# Patient Record
Sex: FEMALE | Race: WHITE | NOT HISPANIC OR LATINO | Employment: PART TIME | URBAN - METROPOLITAN AREA
[De-identification: names, ages, dates, MRNs, and addresses within clinical notes are randomized per-mention and may not be internally consistent; named-entity substitution may affect disease eponyms.]

---

## 2024-06-17 ENCOUNTER — APPOINTMENT (OUTPATIENT)
Dept: RADIOLOGY | Facility: HOSPITAL | Age: 69
End: 2024-06-17
Payer: MEDICARE

## 2024-06-17 ENCOUNTER — HOSPITAL ENCOUNTER (EMERGENCY)
Facility: HOSPITAL | Age: 69
Discharge: HOME | End: 2024-06-17
Payer: MEDICARE

## 2024-06-17 VITALS
SYSTOLIC BLOOD PRESSURE: 124 MMHG | HEIGHT: 60 IN | RESPIRATION RATE: 20 BRPM | DIASTOLIC BLOOD PRESSURE: 74 MMHG | OXYGEN SATURATION: 98 % | WEIGHT: 110 LBS | TEMPERATURE: 99 F | BODY MASS INDEX: 21.6 KG/M2 | HEART RATE: 77 BPM

## 2024-06-17 DIAGNOSIS — S20.212A CONTUSION OF RIB ON LEFT SIDE, INITIAL ENCOUNTER: Primary | ICD-10-CM

## 2024-06-17 PROCEDURE — 71101 X-RAY EXAM UNILAT RIBS/CHEST: CPT | Mod: LEFT SIDE | Performed by: RADIOLOGY

## 2024-06-17 PROCEDURE — 99283 EMERGENCY DEPT VISIT LOW MDM: CPT

## 2024-06-17 PROCEDURE — 71101 X-RAY EXAM UNILAT RIBS/CHEST: CPT | Mod: LT

## 2024-06-17 ASSESSMENT — PAIN SCALES - GENERAL: PAINLEVEL_OUTOF10: 3

## 2024-06-17 ASSESSMENT — LIFESTYLE VARIABLES
HAVE PEOPLE ANNOYED YOU BY CRITICIZING YOUR DRINKING: NO
EVER FELT BAD OR GUILTY ABOUT YOUR DRINKING: NO
HAVE YOU EVER FELT YOU SHOULD CUT DOWN ON YOUR DRINKING: NO
TOTAL SCORE: 0
EVER HAD A DRINK FIRST THING IN THE MORNING TO STEADY YOUR NERVES TO GET RID OF A HANGOVER: NO

## 2024-06-17 ASSESSMENT — COLUMBIA-SUICIDE SEVERITY RATING SCALE - C-SSRS
6. HAVE YOU EVER DONE ANYTHING, STARTED TO DO ANYTHING, OR PREPARED TO DO ANYTHING TO END YOUR LIFE?: NO
1. IN THE PAST MONTH, HAVE YOU WISHED YOU WERE DEAD OR WISHED YOU COULD GO TO SLEEP AND NOT WAKE UP?: NO
2. HAVE YOU ACTUALLY HAD ANY THOUGHTS OF KILLING YOURSELF?: NO

## 2024-06-17 ASSESSMENT — PAIN DESCRIPTION - PAIN TYPE: TYPE: ACUTE PAIN

## 2024-06-17 ASSESSMENT — PAIN - FUNCTIONAL ASSESSMENT: PAIN_FUNCTIONAL_ASSESSMENT: 0-10

## 2024-06-17 NOTE — ED PROVIDER NOTES
HPI   Chief Complaint   Patient presents with    Rib Injury       68-year-old female presents to the emergency department for complaints of left rib pain.  Patient states that she tripped and fell 3 days ago, landing on her left arm which was bent under her ribs.  She states her arm and leg are fine but she is having pain in her ribs.  She states that is very mild, only a 2 out of 10 but she just thought she should make sure was not broken or cracked.  She states that she did not strike her head or lose consciousness.  She denies difficulty breathing, chest pain, abdominal pain, vomiting, bloody stools, bloody urine.  She does not smoke.  She does not take anticoagulants.                          No data recorded                   Patient History   History reviewed. No pertinent past medical history.  History reviewed. No pertinent surgical history.  No family history on file.  Social History     Tobacco Use    Smoking status: Never    Smokeless tobacco: Never   Substance Use Topics    Alcohol use: Not on file    Drug use: Not on file       Physical Exam   ED Triage Vitals [06/17/24 1624]   Temperature Heart Rate Respirations BP   37.2 °C (99 °F) 80 17 128/77      Pulse Ox Temp src Heart Rate Source Patient Position   98 % -- -- --      BP Location FiO2 (%)     -- --       Physical Exam  Vitals and nursing note reviewed.   Constitutional:       General: She is not in acute distress.  HENT:      Head: Atraumatic.      Mouth/Throat:      Mouth: Mucous membranes are moist.      Pharynx: Oropharynx is clear.   Eyes:      Extraocular Movements: Extraocular movements intact.      Conjunctiva/sclera: Conjunctivae normal.      Pupils: Pupils are equal, round, and reactive to light.   Cardiovascular:      Rate and Rhythm: Normal rate and regular rhythm.      Pulses: Normal pulses.   Pulmonary:      Effort: Pulmonary effort is normal. No respiratory distress.      Breath sounds: Normal breath sounds.   Abdominal:       General: There is no distension.      Palpations: Abdomen is soft.      Tenderness: There is no abdominal tenderness. There is no guarding or rebound.   Musculoskeletal:         General: No deformity.      Cervical back: Neck supple.      Comments: Questionable swelling/deformity of the left anterior lower ribs, although no crepitus, ecchymosis, erythema.   Skin:     General: Skin is warm and dry.   Neurological:      Mental Status: She is alert and oriented to person, place, and time. Mental status is at baseline.      Cranial Nerves: No cranial nerve deficit.      Sensory: No sensory deficit.      Motor: No weakness.   Psychiatric:         Mood and Affect: Mood normal.         Behavior: Behavior normal.         ED Course & MDM   Diagnoses as of 06/17/24 1903   Contusion of rib on left side, initial encounter       Medical Decision Making  68-year-old female presents emergency department for complaints of left rib pain after a fall 3 days ago.  On my exam, she has questionable deformity/swelling of her left anterior ribs but no crepitus or skin changes.  Her lungs are clear bilaterally.  Abdomen is soft and nontender.  Patient declines any pain medication.  X-ray of the left ribs and AP chest shows no acute process and no fracture.  Discussed the findings with the patient.  Recommended ice and anti-inflammatories.  Recommended close follow-up with primary care.  Discussed results with patient and/or family/friend and recommended close follow up with primary care or specialist.  Reviewed return precautions at length.  I answered all questions.           Procedure  Procedures     Maria A Brown PA-C  06/17/24 1903

## 2025-06-22 ENCOUNTER — APPOINTMENT (OUTPATIENT)
Dept: RADIOLOGY | Facility: HOSPITAL | Age: 70
End: 2025-06-22
Payer: MEDICARE

## 2025-06-22 ENCOUNTER — HOSPITAL ENCOUNTER (EMERGENCY)
Facility: HOSPITAL | Age: 70
Discharge: HOME | End: 2025-06-22
Payer: MEDICARE

## 2025-06-22 VITALS
TEMPERATURE: 98.2 F | HEART RATE: 76 BPM | WEIGHT: 100 LBS | OXYGEN SATURATION: 96 % | HEIGHT: 60 IN | DIASTOLIC BLOOD PRESSURE: 73 MMHG | BODY MASS INDEX: 19.63 KG/M2 | SYSTOLIC BLOOD PRESSURE: 130 MMHG | RESPIRATION RATE: 16 BRPM

## 2025-06-22 DIAGNOSIS — M25.472 LEFT ANKLE SWELLING: ICD-10-CM

## 2025-06-22 DIAGNOSIS — S93.402A SPRAIN OF LEFT ANKLE, INITIAL ENCOUNTER: Primary | ICD-10-CM

## 2025-06-22 PROCEDURE — 73630 X-RAY EXAM OF FOOT: CPT | Mod: LEFT SIDE | Performed by: RADIOLOGY

## 2025-06-22 PROCEDURE — 99284 EMERGENCY DEPT VISIT MOD MDM: CPT

## 2025-06-22 PROCEDURE — 73610 X-RAY EXAM OF ANKLE: CPT | Mod: LEFT SIDE | Performed by: RADIOLOGY

## 2025-06-22 PROCEDURE — 73630 X-RAY EXAM OF FOOT: CPT | Mod: LT

## 2025-06-22 PROCEDURE — 73610 X-RAY EXAM OF ANKLE: CPT | Mod: LT

## 2025-06-22 ASSESSMENT — PAIN SCALES - GENERAL: PAINLEVEL_OUTOF10: 7

## 2025-06-22 ASSESSMENT — LIFESTYLE VARIABLES
TOTAL SCORE: 0
EVER FELT BAD OR GUILTY ABOUT YOUR DRINKING: NO
HAVE YOU EVER FELT YOU SHOULD CUT DOWN ON YOUR DRINKING: NO
EVER HAD A DRINK FIRST THING IN THE MORNING TO STEADY YOUR NERVES TO GET RID OF A HANGOVER: NO
HAVE PEOPLE ANNOYED YOU BY CRITICIZING YOUR DRINKING: NO

## 2025-06-22 ASSESSMENT — PAIN - FUNCTIONAL ASSESSMENT: PAIN_FUNCTIONAL_ASSESSMENT: 0-10

## 2025-06-26 NOTE — ED PROVIDER NOTES
"HPI   Chief Complaint   Patient presents with    Ankle Pain     \"I missed a step and my ankle rolled.\"  Left ankle       69-year-old female presents to the emergency department for evaluation of left ankle pain that began just prior to arrival.  Patient states she was going down her basement steps when she caught her left foot on the last step and ended up rolling her ankle inward when she stepped down.  Does report that she took Advil at approximately 1100 today with some but minimal relief in her symptoms.  States that she is able to ambulate.  She denies any bruising or swelling.  Denies paresthesias, decreased range of motion, and warmth.  States that she did not fall to the ground, no head injury, loss of consciousness, lightheadedness or dizziness.  Denies chest pain, shortness of breath, fever, chills, body aches.  Denies any other concerns or symptoms at this time.      History provided by:  Patient   used: No      Patient History   Medical History[1]  Surgical History[2]  Family History[3]  Social History[4]    Physical Exam   ED Triage Vitals [06/22/25 1725]   Temperature Heart Rate Respirations BP   36.8 °C (98.2 °F) 76 16 130/73      Pulse Ox Temp Source Heart Rate Source Patient Position   96 % Temporal Monitor Sitting      BP Location FiO2 (%)     Right arm --       Physical Exam  Nursing notes reviewed and confirmed by me.  Chart review performed including medications, allergies, and medical, surgical, and family history    Constitutional: Vital signs per nursing notes.  Well developed, well nourished.  No acute distress.    Psychiatric: no abnormalities of mood or affect   Eyes: PERRL; conjunctivae and lids normal; EOMI  HENT: Head is normocephalic, atraumatic. External ears normal in appearance without drainage.  Nose is without deformity or drainage.  Posterior oropharynx without edema or erythema.  Moist mucous membranes.  Neck: neck supple, no meningismus signs or rigidity.  " trachea midline without deviation.   Respiratory: Breath sounds clear bilaterally no wheezes rales or rhonchi.  No respiratory distress.  Normal respiratory rate/effort.    Cardiovascular: regular rate and rhythm; no murmurs.   distal pulses intact throughout.  Neurological: normal speech patient is alert and oriented x3.  Patient able to move extremities.  Grossly intact strength and sensation of upper and lower extremities.  No focal neurologic deficits appreciated on exam.  GI: Abdomen is soft nontender.  No rebound, rigidity, or guarding.  No masses or hernias appreciated.  No organomegaly.  Lymphatic: no significant lymphadenopathy appreciated  Musculoskeletal: Patient able to move all extremities.  No deformities or swelling appreciated.  No calf tenderness or edema.  Full passive range of motion of left ankle, does have small amount of pain with eversion.  Tenderness to palpation of the left lateral malleolus.  No ecchymosis, small amount of swelling surrounding left ankle.    Skin:  no rash or erythema.  No wounds. Normal capillary refill.      ED Course & MDM   Diagnoses as of 06/25/25 2044   Sprain of left ankle, initial encounter   Left ankle swelling     Labs Reviewed - No data to display     XR ankle left 3+ views   Final Result   1. Mild diffuse soft tissue swelling at the left ankle. No   radiographic evidence for acute fracture at the left ankle or foot.                  MACRO:   None.        Signed by: Lizet Stone 6/22/2025 7:02 PM   Dictation workstation:   [id number]      XR foot left 3+ views   Final Result   1. Mild diffuse soft tissue swelling at the left ankle. No   radiographic evidence for acute fracture at the left ankle or foot.                  MACRO:   None.        Signed by: Lizet Stone 6/22/2025 7:02 PM   Dictation workstation:   [id number]            Medical Decision Making  Differential diagnoses considered but not limited to: Ankle sprain, fracture, dislocation, internal  derangement.    69-year-old female presents to the emergency department for evaluation of left ankle pain that began just prior to arrival after rolling her ankle.  On presentation /73, afebrile, HR 76, RR 16, SpO2 96%.  On exam patient is nontoxic-appearing, no acute distress.  Heart sounds normal with regular rate and rhythm.  Lungs clear to auscultation bilaterally with no adventitious sounds.  Ambulates well on exam with no significant antalgic gait.  Patient does have full passive range of motion of the left ankle, with small amount of pain with eversion as well as tenderness to palpation of the left lateral malleolus.  There is no ecchymosis, masses, however there is a small amount of swelling surrounding the left ankle.  She is neurovascularly intact.  Strong equal palpable pulses throughout.  Brisk capillary refill.  Sensation is intact.  5 out of 5 strength.  Therefore will obtain x-rays of the left ankle and left foot.  X-ray showed mild diffuse soft tissue swelling of the left ankle with no evidence for acute fracture of the ankle or foot.  Patient declines any pain medication today.  I do believe patient is experiencing symptoms consistent with sprain of her left ankle due to an inversion mechanism of action.  I did advise supportive care while at home, Tylenol and ibuprofen for pain control as well as rest, ice and elevation.  Did give patient[facility] information for [facility] if she is experiencing no significant relief in her symptoms within the next couple days she should present for reevaluation and to assess for internal derangement.  She was also advised to follow-up with her PCP within a week.  Patient was also given an Aircast for support while in the emergency department today.  Patient remained neurovascularly intact while in the emergency department.    As a result of the work-up, the patient was discharged home.  she was informed of her clinical diagnosis, educated on  imaging findings, I explained reasons for the patient to return to the Emergency Department and instructed to come back with any concerns or worsening of condition.  she demonstrated verbal understanding and were in agreement with the plan of care.  I emphasized the importance of follow up with the physician I referred them to in the timeframe recommended.  she was given the opportunity to ask questions.  All of the patient's questions were answered.  Patient discharged in good stable condition.    Amount and/or Complexity of Data Reviewed  Radiology: ordered. Decision-making details documented in ED Course.               [1]   Past Medical History:  Diagnosis Date    Diabetes mellitus (Multi)     Hypertension    [2] History reviewed. No pertinent surgical history.  [3] No family history on file.  [4]   Social History  Tobacco Use    Smoking status: Never    Smokeless tobacco: Never   Vaping Use    Vaping status: Never Used   Substance Use Topics    Alcohol use: Never    Drug use: Never        Sea Gallegos PA-C  06/25/25 2055